# Patient Record
Sex: MALE | Race: WHITE | NOT HISPANIC OR LATINO | URBAN - METROPOLITAN AREA
[De-identification: names, ages, dates, MRNs, and addresses within clinical notes are randomized per-mention and may not be internally consistent; named-entity substitution may affect disease eponyms.]

---

## 2017-01-16 ENCOUNTER — Encounter (OUTPATIENT)
Dept: URBAN - METROPOLITAN AREA CLINIC 24 | Facility: CLINIC | Age: 72
End: 2017-01-16
Payer: MEDICARE

## 2017-01-16 PROCEDURE — 99213 OFFICE O/P EST LOW 20 MIN: CPT | Performed by: PHYSICIAN ASSISTANT

## 2017-01-19 ENCOUNTER — SURGERY (OUTPATIENT)
Dept: URBAN - METROPOLITAN AREA SURGERY 12 | Facility: SURGERY | Age: 72
End: 2017-01-19
Payer: MEDICARE

## 2017-01-19 RX ORDER — ERYTHROMYCIN 5 MG/G
OINTMENT OPHTHALMIC
Qty: 1 | Refills: 1 | Status: INACTIVE
Start: 2017-01-19 | End: 2017-02-16

## 2017-01-26 ENCOUNTER — POST OP (OUTPATIENT)
Dept: URBAN - METROPOLITAN AREA CLINIC 24 | Facility: CLINIC | Age: 72
End: 2017-01-26

## 2017-01-26 PROCEDURE — 99024 POSTOP FOLLOW-UP VISIT: CPT | Performed by: OPHTHALMOLOGY

## 2017-02-16 ENCOUNTER — FOLLOW UP ESTABLISHED (OUTPATIENT)
Dept: URBAN - METROPOLITAN AREA CLINIC 30 | Facility: CLINIC | Age: 72
End: 2017-02-16
Payer: MEDICARE

## 2017-02-16 PROCEDURE — 99213 OFFICE O/P EST LOW 20 MIN: CPT | Performed by: OPTOMETRIST

## 2017-02-16 ASSESSMENT — INTRAOCULAR PRESSURE
OS: 14
OD: 15

## 2017-05-15 ENCOUNTER — Encounter (OUTPATIENT)
Dept: URBAN - METROPOLITAN AREA CLINIC 30 | Facility: CLINIC | Age: 72
End: 2017-05-15
Payer: MEDICARE

## 2017-05-15 PROCEDURE — 92083 EXTENDED VISUAL FIELD XM: CPT | Performed by: OPTOMETRIST

## 2017-06-06 ENCOUNTER — FOLLOW UP ESTABLISHED (OUTPATIENT)
Dept: URBAN - METROPOLITAN AREA CLINIC 30 | Facility: CLINIC | Age: 72
End: 2017-06-06
Payer: MEDICARE

## 2017-06-06 PROCEDURE — 99213 OFFICE O/P EST LOW 20 MIN: CPT | Performed by: OPTOMETRIST

## 2017-06-06 PROCEDURE — 92133 CPTRZD OPH DX IMG PST SGM ON: CPT | Performed by: OPTOMETRIST

## 2017-06-06 ASSESSMENT — VISUAL ACUITY
OS: 20/20
OD: 20/25

## 2017-06-06 ASSESSMENT — INTRAOCULAR PRESSURE
OS: 14
OD: 13

## 2017-10-19 ENCOUNTER — FOLLOW UP ESTABLISHED (OUTPATIENT)
Dept: URBAN - METROPOLITAN AREA CLINIC 30 | Facility: CLINIC | Age: 72
End: 2017-10-19
Payer: MEDICARE

## 2017-10-19 PROCEDURE — 92250 FUNDUS PHOTOGRAPHY W/I&R: CPT | Performed by: OPTOMETRIST

## 2017-10-19 PROCEDURE — 92014 COMPRE OPH EXAM EST PT 1/>: CPT | Performed by: OPTOMETRIST

## 2017-10-19 ASSESSMENT — INTRAOCULAR PRESSURE
OS: 14
OD: 14

## 2017-10-19 ASSESSMENT — VISUAL ACUITY
OD: 20/25
OS: 20/25

## 2018-05-10 ENCOUNTER — FOLLOW UP ESTABLISHED (OUTPATIENT)
Dept: URBAN - METROPOLITAN AREA CLINIC 30 | Facility: CLINIC | Age: 73
End: 2018-05-10
Payer: MEDICARE

## 2018-05-10 PROCEDURE — 99213 OFFICE O/P EST LOW 20 MIN: CPT | Performed by: OPTOMETRIST

## 2018-05-10 PROCEDURE — 92083 EXTENDED VISUAL FIELD XM: CPT | Performed by: OPTOMETRIST

## 2018-05-10 ASSESSMENT — INTRAOCULAR PRESSURE
OD: 16
OS: 14

## 2018-10-10 ENCOUNTER — FOLLOW UP ESTABLISHED (OUTPATIENT)
Dept: URBAN - METROPOLITAN AREA CLINIC 30 | Facility: CLINIC | Age: 73
End: 2018-10-10
Payer: MEDICARE

## 2018-10-10 DIAGNOSIS — H04.123 TEAR FILM INSUFFICIENCY OF BILATERAL LACRIMAL GLANDS: ICD-10-CM

## 2018-10-10 PROCEDURE — 92133 CPTRZD OPH DX IMG PST SGM ON: CPT | Performed by: OPTOMETRIST

## 2018-10-10 PROCEDURE — 92014 COMPRE OPH EXAM EST PT 1/>: CPT | Performed by: OPTOMETRIST

## 2018-10-10 PROCEDURE — 92083 EXTENDED VISUAL FIELD XM: CPT | Performed by: OPTOMETRIST

## 2018-10-10 ASSESSMENT — VISUAL ACUITY
OS: 20/20
OD: 20/25

## 2018-10-10 ASSESSMENT — KERATOMETRY
OD: 44.63
OS: 44.63

## 2018-10-10 ASSESSMENT — INTRAOCULAR PRESSURE
OS: 15
OD: 15

## 2019-04-10 ENCOUNTER — FOLLOW UP ESTABLISHED (OUTPATIENT)
Dept: URBAN - METROPOLITAN AREA CLINIC 30 | Facility: CLINIC | Age: 74
End: 2019-04-10
Payer: MEDICARE

## 2019-04-10 PROCEDURE — 99213 OFFICE O/P EST LOW 20 MIN: CPT | Performed by: OPTOMETRIST

## 2019-04-10 ASSESSMENT — INTRAOCULAR PRESSURE
OD: 16
OS: 16

## 2020-07-21 ENCOUNTER — FOLLOW UP ESTABLISHED (OUTPATIENT)
Dept: URBAN - METROPOLITAN AREA CLINIC 30 | Facility: CLINIC | Age: 75
End: 2020-07-21
Payer: MEDICARE

## 2020-07-21 DIAGNOSIS — H40.023 OPEN ANGLE WITH BORDERLINE FINDINGS, HIGH RISK, BILATERAL: Primary | ICD-10-CM

## 2020-07-21 DIAGNOSIS — Z96.1 PRESENCE OF INTRAOCULAR LENS: ICD-10-CM

## 2020-07-21 PROCEDURE — 92133 CPTRZD OPH DX IMG PST SGM ON: CPT | Performed by: OPTOMETRIST

## 2020-07-21 PROCEDURE — 92014 COMPRE OPH EXAM EST PT 1/>: CPT | Performed by: OPTOMETRIST

## 2020-07-21 ASSESSMENT — VISUAL ACUITY
OS: 20/25
OD: 20/25

## 2020-07-21 ASSESSMENT — INTRAOCULAR PRESSURE
OD: 16
OS: 16

## 2020-10-21 ENCOUNTER — FOLLOW UP ESTABLISHED (OUTPATIENT)
Dept: URBAN - METROPOLITAN AREA CLINIC 30 | Facility: CLINIC | Age: 75
End: 2020-10-21
Payer: MEDICARE

## 2020-10-21 DIAGNOSIS — H43.813 VITREOUS DEGENERATION, BILATERAL: ICD-10-CM

## 2020-10-21 PROCEDURE — 92083 EXTENDED VISUAL FIELD XM: CPT | Performed by: OPTOMETRIST

## 2020-10-21 PROCEDURE — 99213 OFFICE O/P EST LOW 20 MIN: CPT | Performed by: OPTOMETRIST

## 2020-10-21 PROCEDURE — 92133 CPTRZD OPH DX IMG PST SGM ON: CPT | Performed by: OPTOMETRIST

## 2020-10-21 PROCEDURE — 92012 INTRM OPH EXAM EST PATIENT: CPT | Performed by: OPTOMETRIST

## 2020-10-21 ASSESSMENT — INTRAOCULAR PRESSURE
OS: 16
OD: 16

## 2021-06-10 ENCOUNTER — OFFICE VISIT (OUTPATIENT)
Dept: URBAN - METROPOLITAN AREA CLINIC 30 | Facility: CLINIC | Age: 76
End: 2021-06-10
Payer: MEDICARE

## 2021-06-10 PROCEDURE — 99213 OFFICE O/P EST LOW 20 MIN: CPT | Performed by: OPTOMETRIST

## 2021-06-10 PROCEDURE — 92134 CPTRZ OPH DX IMG PST SGM RTA: CPT | Performed by: OPTOMETRIST

## 2021-06-10 PROCEDURE — 92083 EXTENDED VISUAL FIELD XM: CPT | Performed by: OPTOMETRIST

## 2021-06-10 ASSESSMENT — INTRAOCULAR PRESSURE
OD: 18
OS: 18

## 2021-06-10 NOTE — IMPRESSION/PLAN
Impression: Open angle with borderline findings, high risk, bilateral
Denies Fhx Thick pachs 626/639
iStent OD Target ~10 OU in the past Plan: IOPs sl higher today, 18 OU Cupping OD>OS with possible rim notching OD, OS inf rim appears thinner-noted at 7/20 visit. RNFL OCT 10/2018 appears stable- borderline inferiorly OU. RNFL 7/20 appears stable OD, poorer scan OS but likely stable. RNFL 10/20 essentially stable. VF update 5/17 essentially full OU-s/p levator resection and this may have impacted sup field; VF update 5/18 unreliable OD (distracted by granddaughter); and essentially full OS. VF 10/18 Grossly WNL OU. VF 10/20 highly unreliable OU, pt notes he felt sleepy during the test. VF 6/21 unreliable again, full OU. Due to stable OCT and low IOPs c thick pachs: Continue to monitor WITHOUT TREATMENT. Extreme non compliance with gtts and if need tx in future consider glc referral. Will probably defer further VF testing, typically unreliable.   4 months CE c RNFL OCT

## 2021-12-17 ENCOUNTER — OFFICE VISIT (OUTPATIENT)
Dept: URBAN - METROPOLITAN AREA CLINIC 30 | Facility: CLINIC | Age: 76
End: 2021-12-17
Payer: MEDICARE

## 2021-12-17 DIAGNOSIS — B02.39 ZOSTER BLEPHARITIS: Primary | ICD-10-CM

## 2021-12-17 PROCEDURE — 99214 OFFICE O/P EST MOD 30 MIN: CPT | Performed by: OPTOMETRIST

## 2021-12-17 PROCEDURE — 92134 CPTRZ OPH DX IMG PST SGM RTA: CPT | Performed by: OPTOMETRIST

## 2021-12-17 RX ORDER — ACYCLOVIR 800 MG/1
800 MG TABLET ORAL
Qty: 35 | Refills: 0 | Status: INACTIVE
Start: 2021-12-17 | End: 2021-12-23

## 2021-12-17 ASSESSMENT — INTRAOCULAR PRESSURE
OD: 20
OS: 19

## 2021-12-17 NOTE — IMPRESSION/PLAN
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 Plan: Symptoms started about 4 days ago. There is significant lid edema/erythema and lesion does extend to forehead/lower scalp and to side of head Serrano's sign negative No soft tissue involvement (no corneal staining, no AC reaction, IOP okay, no posterior segment involvement) Start acyclovir 800 mg 5x/day Follow up on Monday as scheduled Recommend see PCP for follow up

## 2023-04-14 ENCOUNTER — OFFICE VISIT (OUTPATIENT)
Dept: URBAN - METROPOLITAN AREA CLINIC 30 | Facility: CLINIC | Age: 78
End: 2023-04-14
Payer: MEDICARE

## 2023-04-14 DIAGNOSIS — Z96.1 PRESENCE OF INTRAOCULAR LENS: ICD-10-CM

## 2023-04-14 DIAGNOSIS — H43.813 VITREOUS DEGENERATION, BILATERAL: ICD-10-CM

## 2023-04-14 DIAGNOSIS — H40.1131 PRIMARY OPEN-ANGLE GLAUCOMA, MILD STAGE, BILATERAL: Primary | ICD-10-CM

## 2023-04-14 PROCEDURE — 99214 OFFICE O/P EST MOD 30 MIN: CPT | Performed by: OPTOMETRIST

## 2023-04-14 PROCEDURE — 92133 CPTRZD OPH DX IMG PST SGM ON: CPT | Performed by: OPTOMETRIST

## 2023-04-14 RX ORDER — LATANOPROST 50 UG/ML
0.005 % SOLUTION OPHTHALMIC
Qty: 5 | Refills: 3 | Status: ACTIVE
Start: 2023-04-14

## 2023-04-14 ASSESSMENT — KERATOMETRY
OD: 45.06
OS: 44.66

## 2023-04-14 ASSESSMENT — INTRAOCULAR PRESSURE
OS: 19
OD: 19

## 2023-04-14 ASSESSMENT — VISUAL ACUITY
OD: 20/40
OS: 20/30

## 2023-04-14 NOTE — IMPRESSION/PLAN
Impression: Primary open-angle glaucoma, mild stage, bilateral: H40.1131. Denies Fhx Thick pachs 626/639
iStent OU Target ~10 OU in the past Plan: IOPs 19 OU Cupping OD>OS with inf rim notching OD, OS 
RNFL OCT 10/2018 appears stable- borderline inferiorly OU. RNFL 7/20 appears stable OD, poorer scan OS but likely stable. RNFL 10/20 essentially stable. RNFL 4/23: sl thinner OU. VF update 5/17 essentially full OU-s/p levator resection and this may have impacted sup field; VF update 5/18 unreliable OD (distracted by granddaughter); and essentially full OS. VF 10/18 Grossly WNL OU. VF 10/20 highly unreliable OU, pt notes he felt sleepy during the test. VF 6/21 unreliable again, full OU. Now due to increaseing IOP and OCT/structural changes, rec start tx c latnoprost QHS. h/o extreme non compliance with gtts in the past and may need glc referral. 
VF has been unreliable, but will try once more next visit.

## 2023-05-12 ENCOUNTER — OFFICE VISIT (OUTPATIENT)
Dept: URBAN - METROPOLITAN AREA CLINIC 30 | Facility: CLINIC | Age: 78
End: 2023-05-12
Payer: MEDICARE

## 2023-05-12 DIAGNOSIS — H40.1131 PRIMARY OPEN-ANGLE GLAUCOMA, BILATERAL, MILD STAGE: Primary | ICD-10-CM

## 2023-05-12 PROCEDURE — 92083 EXTENDED VISUAL FIELD XM: CPT | Performed by: OPTOMETRIST

## 2023-05-12 PROCEDURE — 99213 OFFICE O/P EST LOW 20 MIN: CPT | Performed by: OPTOMETRIST

## 2023-05-12 ASSESSMENT — INTRAOCULAR PRESSURE
OS: 15
OD: 17

## 2023-05-12 NOTE — IMPRESSION/PLAN
Impression: Primary open-angle glaucoma, mild stage, bilateral: H40.1131. Denies Fhx Thick pachs 626/639
iStent OU Target ~10 OU in the past Plan: IOPs lower today, (17,15) on latanoprost QHS OU-forgot drops last night but notes he otherwise has been compliant (used drop this am). Cupping OD>OS with inf rim notching OD, OS 
RNFL OCT 10/2018 appears stable- borderline inferiorly OU. RNFL 7/20 appears stable OD, poorer scan OS but likely stable. RNFL 10/20 essentially stable. RNFL 4/23: sl thinner OU. VF update 5/17 essentially full OU-s/p levator resection and this may have impacted sup field; VF update 5/18 unreliable OD (distracted by granddaughter); and essentially full OS. VF 10/18 Grossly WNL OU. VF 10/20 highly unreliable OU, pt notes he felt sleepy during the test. VF 6/21 unreliable again, full OU. VF 5/2023: mild scatter OD, sup scatter OS, 2 nasal defects OS. Now due to increasing IOP and OCT/structural changes,was started on tx c latanoprost QHS OU- 4/2023. 
h/o extreme non compliance with gtts in the past and may need glc referral. 
VF had been unreliable, but better today. Discussed SLT as an option, stay on drops for now.

## 2025-01-23 ENCOUNTER — OFFICE VISIT (OUTPATIENT)
Dept: URBAN - METROPOLITAN AREA CLINIC 30 | Facility: CLINIC | Age: 80
End: 2025-01-23
Payer: MEDICARE

## 2025-01-23 DIAGNOSIS — H40.1131 PRIMARY OPEN-ANGLE GLAUCOMA, MILD STAGE, BILATERAL: ICD-10-CM

## 2025-01-23 DIAGNOSIS — H34.8110 CENTRAL RETINAL VEIN OCCLUSION W/ MACULAR EDEMA, RIGHT EYE: Primary | ICD-10-CM

## 2025-01-23 DIAGNOSIS — Z91.81 HISTORY OF FALLING: ICD-10-CM

## 2025-01-23 PROCEDURE — 99214 OFFICE O/P EST MOD 30 MIN: CPT | Performed by: OPTOMETRIST

## 2025-01-23 PROCEDURE — 92133 CPTRZD OPH DX IMG PST SGM ON: CPT | Performed by: OPTOMETRIST

## 2025-01-23 PROCEDURE — 92134 CPTRZ OPH DX IMG PST SGM RTA: CPT | Performed by: OPTOMETRIST

## 2025-01-23 RX ORDER — LATANOPROST 50 UG/ML
0.005 % SOLUTION OPHTHALMIC
Qty: 7.5 | Refills: 3 | Status: ACTIVE
Start: 2025-01-23

## 2025-01-23 ASSESSMENT — VISUAL ACUITY
OS: 20/20
OD: 20/40

## 2025-01-23 ASSESSMENT — INTRAOCULAR PRESSURE
OD: 18
OS: 17

## 2025-01-23 ASSESSMENT — KERATOMETRY
OD: 45.38
OS: 51.13

## 2025-02-27 ENCOUNTER — OFFICE VISIT (OUTPATIENT)
Dept: URBAN - METROPOLITAN AREA CLINIC 7 | Facility: CLINIC | Age: 80
End: 2025-02-27
Payer: MEDICARE

## 2025-02-27 DIAGNOSIS — Z96.1 PRESENCE OF INTRAOCULAR LENS: ICD-10-CM

## 2025-02-27 DIAGNOSIS — H35.81 RETINAL EDEMA: ICD-10-CM

## 2025-02-27 DIAGNOSIS — H40.023 OPEN ANGLE WITH BORDERLINE FINDINGS, HIGH RISK, BILATERAL: ICD-10-CM

## 2025-02-27 DIAGNOSIS — H34.8110 CENTRAL RETINAL VEIN OCCLUSION W/ MACULAR EDEMA, RIGHT EYE: Primary | ICD-10-CM

## 2025-02-27 DIAGNOSIS — H43.813 VITREOUS DEGENERATION, BILATERAL: ICD-10-CM

## 2025-02-27 PROCEDURE — 92235 FLUORESCEIN ANGRPH MLTIFRAME: CPT | Performed by: STUDENT IN AN ORGANIZED HEALTH CARE EDUCATION/TRAINING PROGRAM

## 2025-02-27 PROCEDURE — 99204 OFFICE O/P NEW MOD 45 MIN: CPT | Performed by: STUDENT IN AN ORGANIZED HEALTH CARE EDUCATION/TRAINING PROGRAM

## 2025-02-27 PROCEDURE — 92134 CPTRZ OPH DX IMG PST SGM RTA: CPT | Performed by: STUDENT IN AN ORGANIZED HEALTH CARE EDUCATION/TRAINING PROGRAM

## 2025-02-27 ASSESSMENT — INTRAOCULAR PRESSURE
OD: 17
OS: 18

## 2025-05-01 ENCOUNTER — OFFICE VISIT (OUTPATIENT)
Dept: URBAN - METROPOLITAN AREA CLINIC 41 | Facility: CLINIC | Age: 80
End: 2025-05-01
Payer: MEDICARE

## 2025-05-01 DIAGNOSIS — H34.8110 CENTRAL RETINAL VEIN OCCLUSION, RIGHT EYE, WITH MACULAR EDEMA: Primary | ICD-10-CM

## 2025-05-01 PROCEDURE — 67028 INJECTION EYE DRUG: CPT | Performed by: STUDENT IN AN ORGANIZED HEALTH CARE EDUCATION/TRAINING PROGRAM

## 2025-05-01 PROCEDURE — 92134 CPTRZ OPH DX IMG PST SGM RTA: CPT | Performed by: STUDENT IN AN ORGANIZED HEALTH CARE EDUCATION/TRAINING PROGRAM

## 2025-05-01 ASSESSMENT — INTRAOCULAR PRESSURE
OS: 11
OD: 13